# Patient Record
Sex: FEMALE | Race: WHITE | URBAN - NONMETROPOLITAN AREA
[De-identification: names, ages, dates, MRNs, and addresses within clinical notes are randomized per-mention and may not be internally consistent; named-entity substitution may affect disease eponyms.]

---

## 2024-01-09 ENCOUNTER — OFFICE VISIT (OUTPATIENT)
Dept: PRIMARY CARE CLINIC | Age: 19
End: 2024-01-09
Payer: COMMERCIAL

## 2024-01-09 ENCOUNTER — HOSPITAL ENCOUNTER (OUTPATIENT)
Age: 19
Setting detail: SPECIMEN
Discharge: HOME | End: 2024-01-09
Payer: COMMERCIAL

## 2024-01-09 VITALS
BODY MASS INDEX: 17.94 KG/M2 | WEIGHT: 97.5 LBS | OXYGEN SATURATION: 98 % | HEART RATE: 87 BPM | TEMPERATURE: 96.8 F | SYSTOLIC BLOOD PRESSURE: 98 MMHG | HEIGHT: 62 IN | DIASTOLIC BLOOD PRESSURE: 64 MMHG | RESPIRATION RATE: 14 BRPM

## 2024-01-09 DIAGNOSIS — J02.9 SORE THROAT: ICD-10-CM

## 2024-01-09 DIAGNOSIS — J02.9 SORE THROAT: Primary | ICD-10-CM

## 2024-01-09 LAB — S PYO AG THROAT QL: NORMAL

## 2024-01-09 PROCEDURE — 99213 OFFICE O/P EST LOW 20 MIN: CPT | Performed by: NURSE PRACTITIONER

## 2024-01-09 PROCEDURE — 87880 STREP A ASSAY W/OPTIC: CPT | Performed by: NURSE PRACTITIONER

## 2024-01-09 PROCEDURE — 87651 STREP A DNA AMP PROBE: CPT

## 2024-01-09 ASSESSMENT — ENCOUNTER SYMPTOMS
ABDOMINAL PAIN: 0
SHORTNESS OF BREATH: 0
WHEEZING: 0
SORE THROAT: 1
CHEST TIGHTNESS: 0
NAUSEA: 0
VOMITING: 0
COUGH: 1
RHINORRHEA: 1

## 2024-01-09 NOTE — PROGRESS NOTES
[facility]             [address] [address]                        Telephone [phone]             Fax [phone]     Monique Landis  2005  MRN:[id number]   Date of visit:  1/9/2024    Subjective:    Monique Landis is a 18 y.o.  female who presents to [facility] today (1/9/2024) for evaluation of:    Chief Complaint   Patient presents with    Pharyngitis     Sore throat since yesterday.        Pharyngitis  This is a new problem. The current episode started in the past 7 days (01/07/24). The problem occurs constantly. The problem has been gradually worsening. Associated symptoms include coughing (dry), headaches (intermittent), myalgias and a sore throat (8/10). Pertinent negatives include no abdominal pain, chest pain, chills, congestion, fever, nausea, rash or vomiting. Associated symptoms comments: rhinorrhea. The symptoms are aggravated by swallowing. She has tried acetaminophen for the symptoms. The treatment provided mild relief.       She has the following problem list:  There is no problem list on file for this patient.       Current medications are:  No current outpatient medications on file.     No current facility-administered medications for this visit.        She has No Known Allergies..    She  reports that she has never smoked. She has never used smokeless tobacco.      Objective:    Vitals:    01/09/24 1528   BP: 98/64   Site: Left Upper Arm   Position: Sitting   Cuff Size: Medium Adult   Pulse: 87   Resp: 14   Temp: 96.8 °F (36 °C)   TempSrc: Tympanic   SpO2: 98%   Weight: 44.2 kg (97 lb 8 oz)   Height: 1.575 m (5' 2\")     Body mass index is 17.83 kg/m².    Review of Systems   Constitutional: Negative.  Negative for chills and fever.   HENT:  Positive for rhinorrhea and sore throat (8/10). Negative for congestion and postnasal drip.    Respiratory:  Positive for cough (dry). Negative

## 2024-01-10 LAB
MICROORGANISM/AGENT SPEC: NORMAL
SPECIMEN DESCRIPTION: NORMAL